# Patient Record
Sex: FEMALE | Race: ASIAN | NOT HISPANIC OR LATINO | Employment: FULL TIME | URBAN - METROPOLITAN AREA
[De-identification: names, ages, dates, MRNs, and addresses within clinical notes are randomized per-mention and may not be internally consistent; named-entity substitution may affect disease eponyms.]

---

## 2019-11-05 ENCOUNTER — ANESTHESIA EVENT (OUTPATIENT)
Dept: PERIOP | Facility: AMBULARY SURGERY CENTER | Age: 44
End: 2019-11-05
Payer: COMMERCIAL

## 2019-11-07 RX ORDER — ACETAMINOPHEN AND CODEINE PHOSPHATE 120; 12 MG/5ML; MG/5ML
1 SOLUTION ORAL
COMMUNITY

## 2019-11-07 RX ORDER — MULTIVIT WITH MINERALS/LUTEIN
1000 TABLET ORAL DAILY
COMMUNITY

## 2019-11-07 NOTE — PRE-PROCEDURE INSTRUCTIONS
Pre-Surgery Instructions:   Medication Instructions    Ascorbic Acid (VITAMIN C) 1000 MG tablet Instructed patient per Anesthesia Guidelines   B Complex Vitamins (VITAMIN-B COMPLEX PO) Instructed patient per Anesthesia Guidelines   Calcium-Magnesium-Vitamin D (CALCIUM 500 PO) Instructed patient per Anesthesia Guidelines   Cholecalciferol (VITAMIN D3 PO) Instructed patient per Anesthesia Guidelines   Coenzyme Q10 (CO Q 10 PO) Instructed patient per Anesthesia Guidelines   norethindrone (MICRONOR) 0 35 MG tablet Instructed patient per Anesthesia Guidelines   Prenatal Vit-Fe Fumarate-FA (PRENATAL FORMULA PO) Instructed patient per Anesthesia Guidelines      Pre op,medications and showering instructions reviewed-Patient given hibiclens by the  Surgeons office

## 2019-11-17 NOTE — ANESTHESIA PREPROCEDURE EVALUATION
Review of Systems/Medical History  Patient summary reviewed  Chart reviewed      Cardiovascular  Negative cardio ROS Exercise tolerance (METS): >4,     Pulmonary  Negative pulmonary ROS        GI/Hepatic  Negative GI/hepatic ROS   Liver disease , Hepatitis ,        Negative  ROS        Endo/Other  Negative endo/other ROS      GYN  Negative gynecology ROS          Hematology  Negative hematology ROS      Musculoskeletal  Negative musculoskeletal ROS        Neurology  Negative neurology ROS      Psychology   Negative psychology ROS              Physical Exam    Airway    Mallampati score: II  TM Distance: >3 FB  Neck ROM: full     Dental   No notable dental hx     Cardiovascular  Comment: Negative ROS, Rhythm: regular, Rate: normal, Cardiovascular exam normal    Pulmonary  Pulmonary exam normal Breath sounds clear to auscultation,     Other Findings        Anesthesia Plan  ASA Score- 2     Anesthesia Type- general with ASA Monitors  Additional Monitors:   Airway Plan: LMA  Plan Factors- Patient instructed to abstain from smoking on day of procedure  Patient did not smoke on day of surgery  Induction- intravenous  Postoperative Plan- Plan for postoperative opioid use  Planned trial extubation    Informed Consent- Anesthetic plan and risks discussed with patient  I personally reviewed this patient with the CRNA  Discussed and agreed on the Anesthesia Plan with the CRNA  Gisela Fregoso

## 2019-11-18 ENCOUNTER — APPOINTMENT (OUTPATIENT)
Dept: RADIOLOGY | Facility: AMBULARY SURGERY CENTER | Age: 44
End: 2019-11-18
Payer: COMMERCIAL

## 2019-11-18 ENCOUNTER — ANESTHESIA (OUTPATIENT)
Dept: PERIOP | Facility: AMBULARY SURGERY CENTER | Age: 44
End: 2019-11-18
Payer: COMMERCIAL

## 2019-11-18 ENCOUNTER — HOSPITAL ENCOUNTER (OUTPATIENT)
Facility: AMBULARY SURGERY CENTER | Age: 44
Setting detail: OUTPATIENT SURGERY
Discharge: HOME | End: 2019-11-18
Attending: OBSTETRICS & GYNECOLOGY | Admitting: OBSTETRICS & GYNECOLOGY
Payer: COMMERCIAL

## 2019-11-18 VITALS
BODY MASS INDEX: 18.64 KG/M2 | HEIGHT: 66 IN | TEMPERATURE: 97.5 F | SYSTOLIC BLOOD PRESSURE: 118 MMHG | HEART RATE: 63 BPM | DIASTOLIC BLOOD PRESSURE: 62 MMHG | RESPIRATION RATE: 18 BRPM | OXYGEN SATURATION: 99 % | WEIGHT: 116 LBS

## 2019-11-18 DIAGNOSIS — N97.1 FEMALE INFERTILITY OF TUBAL ORIGIN: ICD-10-CM

## 2019-11-18 DIAGNOSIS — N84.0 POLYP OF CORPUS UTERI: ICD-10-CM

## 2019-11-18 LAB
EXT PREGNANCY TEST URINE: NEGATIVE
EXT. CONTROL: NORMAL

## 2019-11-18 PROCEDURE — 88305 TISSUE EXAM BY PATHOLOGIST: CPT | Performed by: PATHOLOGY

## 2019-11-18 PROCEDURE — 74740 X-RAY FEMALE GENITAL TRACT: CPT

## 2019-11-18 PROCEDURE — 58340 CATHETER FOR HYSTEROGRAPHY: CPT

## 2019-11-18 RX ORDER — FENTANYL CITRATE/PF 50 MCG/ML
25 SYRINGE (ML) INJECTION
Status: DISCONTINUED | OUTPATIENT
Start: 2019-11-18 | End: 2019-11-18 | Stop reason: HOSPADM

## 2019-11-18 RX ORDER — MIDAZOLAM HYDROCHLORIDE 2 MG/2ML
INJECTION, SOLUTION INTRAMUSCULAR; INTRAVENOUS AS NEEDED
Status: DISCONTINUED | OUTPATIENT
Start: 2019-11-18 | End: 2019-11-18 | Stop reason: SURG

## 2019-11-18 RX ORDER — DEXAMETHASONE SODIUM PHOSPHATE 4 MG/ML
INJECTION, SOLUTION INTRA-ARTICULAR; INTRALESIONAL; INTRAMUSCULAR; INTRAVENOUS; SOFT TISSUE AS NEEDED
Status: DISCONTINUED | OUTPATIENT
Start: 2019-11-18 | End: 2019-11-18 | Stop reason: SURG

## 2019-11-18 RX ORDER — FENTANYL CITRATE 50 UG/ML
INJECTION, SOLUTION INTRAMUSCULAR; INTRAVENOUS AS NEEDED
Status: DISCONTINUED | OUTPATIENT
Start: 2019-11-18 | End: 2019-11-18 | Stop reason: SURG

## 2019-11-18 RX ORDER — MAGNESIUM HYDROXIDE 1200 MG/15ML
LIQUID ORAL AS NEEDED
Status: DISCONTINUED | OUTPATIENT
Start: 2019-11-18 | End: 2019-11-18 | Stop reason: HOSPADM

## 2019-11-18 RX ORDER — SODIUM CHLORIDE, SODIUM LACTATE, POTASSIUM CHLORIDE, CALCIUM CHLORIDE 600; 310; 30; 20 MG/100ML; MG/100ML; MG/100ML; MG/100ML
125 INJECTION, SOLUTION INTRAVENOUS CONTINUOUS
Status: DISCONTINUED | OUTPATIENT
Start: 2019-11-18 | End: 2019-11-18 | Stop reason: HOSPADM

## 2019-11-18 RX ORDER — PROPOFOL 10 MG/ML
INJECTION, EMULSION INTRAVENOUS AS NEEDED
Status: DISCONTINUED | OUTPATIENT
Start: 2019-11-18 | End: 2019-11-18 | Stop reason: SURG

## 2019-11-18 RX ORDER — ACETAMINOPHEN 325 MG/1
975 TABLET ORAL EVERY 8 HOURS PRN
Status: DISCONTINUED | OUTPATIENT
Start: 2019-11-18 | End: 2019-11-18 | Stop reason: HOSPADM

## 2019-11-18 RX ORDER — LIDOCAINE HYDROCHLORIDE 10 MG/ML
INJECTION, SOLUTION EPIDURAL; INFILTRATION; INTRACAUDAL; PERINEURAL AS NEEDED
Status: DISCONTINUED | OUTPATIENT
Start: 2019-11-18 | End: 2019-11-18 | Stop reason: SURG

## 2019-11-18 RX ORDER — KETOROLAC TROMETHAMINE 30 MG/ML
INJECTION, SOLUTION INTRAMUSCULAR; INTRAVENOUS AS NEEDED
Status: DISCONTINUED | OUTPATIENT
Start: 2019-11-18 | End: 2019-11-18 | Stop reason: SURG

## 2019-11-18 RX ORDER — ONDANSETRON 2 MG/ML
INJECTION INTRAMUSCULAR; INTRAVENOUS AS NEEDED
Status: DISCONTINUED | OUTPATIENT
Start: 2019-11-18 | End: 2019-11-18 | Stop reason: SURG

## 2019-11-18 RX ORDER — ONDANSETRON 2 MG/ML
4 INJECTION INTRAMUSCULAR; INTRAVENOUS ONCE AS NEEDED
Status: DISCONTINUED | OUTPATIENT
Start: 2019-11-18 | End: 2019-11-18 | Stop reason: HOSPADM

## 2019-11-18 RX ORDER — PROMETHAZINE HYDROCHLORIDE 25 MG/ML
12.5 INJECTION, SOLUTION INTRAMUSCULAR; INTRAVENOUS ONCE AS NEEDED
Status: DISCONTINUED | OUTPATIENT
Start: 2019-11-18 | End: 2019-11-18 | Stop reason: HOSPADM

## 2019-11-18 RX ORDER — LIDOCAINE HYDROCHLORIDE 10 MG/ML
0.5 INJECTION, SOLUTION EPIDURAL; INFILTRATION; INTRACAUDAL; PERINEURAL ONCE AS NEEDED
Status: DISCONTINUED | OUTPATIENT
Start: 2019-11-18 | End: 2019-11-18 | Stop reason: HOSPADM

## 2019-11-18 RX ADMIN — MIDAZOLAM HYDROCHLORIDE 2 MG: 1 INJECTION, SOLUTION INTRAMUSCULAR; INTRAVENOUS at 09:13

## 2019-11-18 RX ADMIN — PROPOFOL 150 MG: 10 INJECTION, EMULSION INTRAVENOUS at 09:16

## 2019-11-18 RX ADMIN — FENTANYL CITRATE 25 MCG: 50 INJECTION, SOLUTION INTRAMUSCULAR; INTRAVENOUS at 09:23

## 2019-11-18 RX ADMIN — SODIUM CHLORIDE, SODIUM LACTATE, POTASSIUM CHLORIDE, AND CALCIUM CHLORIDE: .6; .31; .03; .02 INJECTION, SOLUTION INTRAVENOUS at 09:12

## 2019-11-18 RX ADMIN — ONDANSETRON 4 MG: 2 INJECTION INTRAMUSCULAR; INTRAVENOUS at 09:13

## 2019-11-18 RX ADMIN — LIDOCAINE HYDROCHLORIDE 40 MG: 10 INJECTION, SOLUTION EPIDURAL; INFILTRATION; INTRACAUDAL; PERINEURAL at 09:16

## 2019-11-18 RX ADMIN — DEXAMETHASONE SODIUM PHOSPHATE 4 MG: 4 INJECTION, SOLUTION INTRAMUSCULAR; INTRAVENOUS at 09:22

## 2019-11-18 RX ADMIN — KETOROLAC TROMETHAMINE 30 MG: 30 INJECTION, SOLUTION INTRAMUSCULAR at 09:41

## 2019-11-18 NOTE — DISCHARGE INSTRUCTIONS
Hysteroscopy   WHAT YOU SHOULD KNOW:   A hysteroscopy is a procedure to look at the inside of your uterus  Other procedures may also be done during the hysteroscopy  AFTER YOU LEAVE:   Medicines:   · Acetaminophen  decreases pain  It is available without a doctor's order  Ask how much to take and how often to take it  Follow directions  Acetaminophen can cause liver damage if not taken correctly  · NSAIDs  help decrease swelling and pain  This medicine is available with or without a doctor's order  NSAIDs can cause stomach bleeding or kidney problems in certain people  If you take blood thinner medicine, always ask your primary healthcare provider (PHP) if NSAIDs are safe for you  Always read the medicine label and follow directions  · Take your medicine as directed  Contact your PHP if you think your medicine is not helping or if you have side effects  Tell him if you are allergic to any medicine  Keep a list of the medicines, vitamins, and herbs you take  Include the amounts, and when and why you take them  Bring the list or the pill bottles to follow-up visits  Carry your medicine list with you in case of an emergency  Follow up with your PHP or gynecologist as directed:  Write down your questions so you remember to ask them during your visits  Activity guidelines: You may feel like resting more after the procedure  Slowly start to do more each day  Rest when you feel it is needed  Ask your PHP when you can return to your daily activities  Self-care:   · Do not put anything into your vagina until your PHP says it is okay  Do not have sex, douche, or use tampons  · You may need to continue to wear a sanitary pad for up to a week  You may have light bleeding or spotting  Contact your PHP if:   · You have a fever  · You have to change your sanitary pad every hour  · You have chills, a cough, or feel weak and achy  · You have abdominal pain that does not go away      · You have abnormal or foul-smelling vaginal discharge  · Your skin is itchy, swollen, or you have a rash  · You have questions or concerns about your condition or care  © 2014 [address] is for End User's use only and may not be sold, redistributed or otherwise used for commercial purposes  All illustrations and images included in CareNotes® are the copyrighted property of [employer]  or Ed Holder  The above information is an  only  It is not intended as medical advice for individual conditions or treatments  Talk to your doctor, nurse or pharmacist before following any medical regimen to see if it is safe and effective for you  Hysterosalpingography   WHAT YOU NEED TO KNOW:   Hysterosalpingography (HSG) is a procedure to look inside your uterus and fallopian tubes  DISCHARGE INSTRUCTIONS:   Medicines:   · Medicines  may be given for pain  Ask your healthcare provider how to take prescription pain medicine safely  Antibiotics may be given to help treat or prevent a bacterial infection  · Take your medicine as directed  Contact your healthcare provider if you think your medicine is not helping or if you have side effects  Tell him if you are allergic to any medicine  Keep a list of the medicines, vitamins, and herbs you take  Include the amounts, and when and why you take them  Bring the list or the pill bottles to follow-up visits  Carry your medicine list with you in case of an emergency  Follow up with your healthcare provider or gynecologist as directed:  Write down your questions so you remember to ask them during your visits  Vaginal care: You may need vaginal pads for 1 to 2 days if you have some spotting  Some leftover dye may also come out of your vagina  When you arrive home, carefully wash your vagina with soap and water  Afterwards, put on a clean vaginal pad  Change your vaginal pad as needed  Ask for more information about vaginal care    Contact your healthcare provider or gynecologist if:   · You have a fever  · You have chills, a cough, or feel weak and achy  · You have nausea or are vomiting  · Your pads become soaked with blood  · Your skin is itchy, swollen, or has a rash  · You are urinating less often than normal     · You have questions or concerns about your condition or care  Seek care immediately or call 911 if:   · You feel something is bulging into your vagina and not going back  · You have stomach pain that does not go away even after you take pain medicine  · You have sudden breathing problems, hives, or other signs of an allergic reaction to the dye  · You have blood in your urine  · You have pain when you urinate or have sex  · You have pus or a foul-smelling odor coming from your vagina  © 2017 [address] is for End User's use only and may not be sold, redistributed or otherwise used for commercial purposes  All illustrations and images included in CareNotes® are the copyrighted property of [employer]  or Ed Holder  The above information is an  only  It is not intended as medical advice for individual conditions or treatments  Talk to your doctor, nurse or pharmacist before following any medical regimen to see if it is safe and effective for you

## 2019-11-18 NOTE — DISCHARGE INSTR - AVS FIRST PAGE
Gynecology Discharge Instructions  1  Nothing in the vagina for 2 weeks or until cleared by a physician  2  No tub baths or swimming  3  Call the office for fever greater than 100 4, heavy vaginal bleeding or increasing pain  4  No driving until no longer requiring narcotic pain medications  5   Activity as tolerated

## 2019-11-18 NOTE — INTERVAL H&P NOTE
H&P reviewed  After examining the patient I find no changes in the patients condition since the H&P had been written      Vitals:    11/18/19 0740   BP: 133/84   Pulse: 82   Resp: 18   Temp: (!) 97 2 °F (36 2 °C)   SpO2: 99%

## 2019-11-18 NOTE — ANESTHESIA POSTPROCEDURE EVALUATION
Post-Op Assessment Note    CV Status:  Stable  Pain Score: 0    Pain management: adequate     Mental Status:  Alert, awake and arousable   Hydration Status:  Euvolemic   PONV Controlled:  Controlled   Airway Patency:  Patent   Post Op Vitals Reviewed: Yes      Staff: CRNA           BP   131/65   Temp  97 8   Pulse  80   Resp   14   SpO2   100%

## 2019-11-18 NOTE — OP NOTE
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]    Post-Op Diagnosis Codes:     * Polyp of corpus uteri [N84 0]       * B/L tubal patency     Procedure(s) (LRB):  HYSTEROSALPINGOGRAPHY (N/A)  HYSTEROSCOPY; POLYPECTOMY; ENDOMETRIAL BIOPSY (MYOSURE) (N/A)    Specimen(s):  ID Type Source Tests Collected by Time Destination   1 : ENDOMETRIAL POLYP AND BIOSPY Tissue Polyp, Cervical/Endometrial TISSUE EXAM Jared Weiner DO 2019 0946        Estimated Blood Loss:   Minimal    Drains:  * No LDAs found *    Urine:  200 ml    IVF: 800 ml    Hysteroscopic fluid deficit: 290 ml    Anesthesia Type:   Choice    Operative Indications:  Polyp of corpus uteri [N84 0]  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]      Operative Findings: The uterus sounded to 9 centimeters RV using an endometrial biopsy [place]  On hysterosalpingogram the uterine contour was normal   The left fallopian tube filled and spilled briskly the right fallopian tube filled and spilled however the right fallopian tube did appear scalloped in nature  There was no evidence of hydrosalpinx  On hysteroscopy both ostia were well visualized and there were polyps within the uterine cavity  Otherwise the uterine cavity was noted to be normal     Complications:   None    Procedure and Technique:  The patient was identified in the preoperative holding area and taken to the operative suite where she was placed in supine position  She then underwent general LMA anesthesia was placed in dorsal lithotomy position and prepped and draped in the normal sterile fashion  Time-out was held according to protocol and was deemed we could begin the procedure    A Liao catheter was inserted and drained the bladder of urine  The cervix was visualized using a [place] retractor posteriorly and a [place] retractor anteriorly  The anterior lip of the cervix was grasped with single-tooth tenaculum  The uterus sounded to 9 centimeters retroverted using an endometrial biopsy [place]  A saline sono catheter 5  [place] was inserted into the uterine cavity and the balloon insufflated  Hysterosalpingogram was performed  Both fallopian tubes filled and spilled however the right fallopian tube appeared scalloped in nature  There was no evidence of hydrosalpinx  The uterine contour was normal     The saline sono catheter was then removed  The cervix was progressively dilated to a number 23 [place] using Ed Gallo dilators  The hysteroscope was then inserted and a careful survey of the cavity revealed the findings as noted above  Both ostia were well visualized and there were polyps throughout the cavity  There were no other intracavitary lesions  Using the MyoSure hysteroscope device the polyps were then removed biopsies taken of the lining and tissue sent to pathology for further evaluation  This concluded the procedure  All instruments removed the patient's vagina  Hemostasis was observed  The patient was returned to supine position and awakened from anesthesia and taken to the recovery room were she was noted to be in stable condition  It should be noted at the end of the procedure all sponge lap needle instrument counts were correct x2 per the RN       I was present for the entire procedure    Patient Disposition:  PACU     SIGNATURE: Lashon Milian DO  DATE: November 18, 2019  TIME: 9:58 AM

## (undated) DEVICE — CATH H/S 5FR 30CM X 1.5ML

## (undated) DEVICE — MAYO STAND COVER: Brand: CONVERTORS

## (undated) DEVICE — MYOSURE SEAL SET

## (undated) DEVICE — CHLORHEXIDINE 4PCT 4 OZ

## (undated) DEVICE — DEVICE MYOSURE TISSUE REMOVAL HYSTEROSCOPIC XLRG

## (undated) DEVICE — GLOVE INDICATOR PI UNDERGLOVE SZ 7 BLUE

## (undated) DEVICE — TUBE OUTFLOW F/FLUID CONTROL SYSTEM AQUILEX

## (undated) DEVICE — LIGHT HANDLE COVER SLEEVE DISP BLUE STELLAR

## (undated) DEVICE — SYRINGE 50ML LL

## (undated) DEVICE — STERILE SURGICAL LUBRICANT,  TUBE: Brand: SURGILUBE

## (undated) DEVICE — SPECIMEN SOCK - SHORT: Brand: MEDI-VAC

## (undated) DEVICE — GLOVE PI ULTRA TOUCH SZ.6.5

## (undated) DEVICE — CATH H/S 7FR 30CM X 4ML

## (undated) DEVICE — TUBE INFLOW F/FLUID CONTROL SYSTEM AQUILEX

## (undated) DEVICE — UNDER BUTTOCKS DRAPE W/FLUID CONTROL POUCH: Brand: CONVERTORS

## (undated) DEVICE — ENDOMETRIAL BX PIPELLE

## (undated) DEVICE — STRL ALLENTOWN HYSTEROSCOPY PK: Brand: CARDINAL HEALTH

## (undated) DEVICE — SPONGE LAP 18 X 18 IN

## (undated) DEVICE — PVC URETHRAL CATHETER: Brand: DOVER

## (undated) DEVICE — PREMIUM DRY TRAY LF: Brand: MEDLINE INDUSTRIES, INC.